# Patient Record
(demographics unavailable — no encounter records)

---

## 2024-12-27 NOTE — IMAGING
[Bilateral] : knee bilaterally [All Views] : anteroposterior, lateral, skyline, and anteroposterior standing [advanced tricompartmental OA with medial compartment narrowing and varus alignment] : advanced tricompartmental OA with medial compartment narrowing and varus alignment [Moderate patellofemoral OA] : Moderate patellofemoral OA

## 2024-12-27 NOTE — HISTORY OF PRESENT ILLNESS
[2] : 2 [Dull/Aching] : dull/aching [Intermittent] : intermittent [Ice] : ice [Walking] : walking [de-identified] : 12/27/24: Monalisa is a 75-year-old female presenting with bilateral knee pain. R>L. Pain has been on and off for years. Patient states pain became steadier 5 weeks ago. Patient states she stopped walking on the treadmill and the pain subsided [FreeTextEntry1] : Right knee

## 2024-12-27 NOTE — ASSESSMENT
[FreeTextEntry1] : BILATERAL KNEE OA WITH RECENT FLARE. PAIN HAS SUBSIDED. WE DISCUSSED OPTIONS.  WILL TRY MOBIC 15 MG QD FOR 5-7 DAYS THEN PRN WITH CAUTION TO USE AND SIDE EFFECTS FU IN 2 WEEKS IF PAIN RESUMES WILL GIVE CORTISONE INEJCTIONS  pt seen and examined by dr candelario